# Patient Record
Sex: MALE | URBAN - METROPOLITAN AREA
[De-identification: names, ages, dates, MRNs, and addresses within clinical notes are randomized per-mention and may not be internally consistent; named-entity substitution may affect disease eponyms.]

---

## 2019-07-17 ENCOUNTER — OFFICE VISIT (OUTPATIENT)
Dept: FAMILY MEDICINE CLINIC | Facility: CLINIC | Age: 54
End: 2019-07-17
Payer: COMMERCIAL

## 2019-07-17 VITALS
HEART RATE: 77 BPM | SYSTOLIC BLOOD PRESSURE: 192 MMHG | DIASTOLIC BLOOD PRESSURE: 102 MMHG | OXYGEN SATURATION: 97 % | BODY MASS INDEX: 26.58 KG/M2 | RESPIRATION RATE: 18 BRPM | WEIGHT: 175.38 LBS | HEIGHT: 68 IN | TEMPERATURE: 97 F

## 2019-07-17 DIAGNOSIS — I10 UNCONTROLLED HYPERTENSION: Primary | ICD-10-CM

## 2019-07-17 PROCEDURE — 93000 ELECTROCARDIOGRAM COMPLETE: CPT | Performed by: NURSE PRACTITIONER

## 2019-07-17 PROCEDURE — 99204 OFFICE O/P NEW MOD 45 MIN: CPT | Performed by: NURSE PRACTITIONER

## 2019-07-17 RX ORDER — LISINOPRIL AND HYDROCHLOROTHIAZIDE 12.5; 1 MG/1; MG/1
1 TABLET ORAL DAILY
Qty: 30 TABLET | Refills: 0 | Status: SHIPPED | OUTPATIENT
Start: 2019-07-17 | End: 2019-07-30

## 2019-07-17 RX ORDER — SILDENAFIL 100 MG/1
TABLET, FILM COATED ORAL
COMMUNITY
Start: 2015-01-30 | End: 2019-07-17

## 2019-07-17 RX ORDER — OLANZAPINE 5 MG/1
5 TABLET ORAL
COMMUNITY
Start: 2008-06-18

## 2019-07-17 NOTE — PROGRESS NOTES
[facility] [place]  PROGRESS NOTE  Chief Complaint:   Patient presents with:  Medication Request      HPI:   This is a 48year old male coming in for cialis refill.       Patient is on zyprexa, is on for Shock deficit syndrome per patient though CARDIOVASCULAR:  Denies chest pain, chest pressure, chest discomfort, palpitations, edema, dyspnea on exertion or at rest.  RESPIRATORY:  Denies shortness of breath, wheezing, cough or sputum.   GASTROINTESTINAL:  Denies abdominal pain, nausea, vomiting, co EXTREMITIES:  No edema, no cyanosis, no clubbing, Full ROM, 2+ dorsalis pedis pulses bilaterally. NEURO:  No deficit, normal gait, strength and tone, sensory intact, normal reflexes. Psych: Appears slightly disoriented, repeating questions.   Not maintain Signed Prescriptions Disp Refills   • Lisinopril-hydroCHLOROthiazide 10-12.5 MG Oral Tab 30 tablet 0     Sig: Take 1 tablet by mouth daily. Patient Instructions     No cialis at this time.   Please start lisinopril/hydrochlorothiazide 1 tablet each · Learn to take your own blood pressure. Keep a record of your results. Ask your doctor which readings mean that you need medical attention. · Take your blood pressure medicine exactly as directed. Don’t skip doses.  Missing doses can cause your blood pres · Limit drinks that contain caffeine such as coffee, black or green tea, and cola to 2 per day. · Never take stimulants such as amphetamines or cocaine. These drugs can be deadly for someone with high blood pressure. · Control your stress.  Learn ways to

## 2019-07-17 NOTE — PATIENT INSTRUCTIONS
No cialis at this time. Please start lisinopril/hydrochlorothiazide 1 tablet each day in the morning. Modify your diet, eat low sodium foods. Get your labs done, get fasting labs.   Obtain your blood pressure cuff for home, check your blood pressure twic control. · If you do miss a dose (or doses) check with your healthcare provider about what to do. · Don't take medicines that contain heart stimulants, including over-the-counter medicines. Check for warnings about high blood pressure on the label.  Ask t men.  Follow-up care  Make a follow-up appointment as directed.   When to call your healthcare provider  Call your healthcare provider right away if you have any of the following:  · Chest pain or shortness of breath (call 911)  · Moderate to severe headach

## 2019-07-18 LAB
ABSOLUTE BASOPHILS: 30 CELLS/UL (ref 0–200)
ABSOLUTE EOSINOPHILS: 168 CELLS/UL (ref 15–500)
ABSOLUTE LYMPHOCYTES: 1620 CELLS/UL (ref 850–3900)
ABSOLUTE MONOCYTES: 372 CELLS/UL (ref 200–950)
ABSOLUTE NEUTROPHILS: 3810 CELLS/UL (ref 1500–7800)
ALBUMIN/GLOBULIN RATIO: 1.5 (CALC) (ref 1–2.5)
ALBUMIN: 4.6 G/DL (ref 3.6–5.1)
ALKALINE PHOSPHATASE: 67 U/L (ref 40–115)
ALT: 15 U/L (ref 9–46)
AST: 17 U/L (ref 10–35)
BASOPHILS: 0.5 %
BILIRUBIN, TOTAL: 1.3 MG/DL (ref 0.2–1.2)
BUN: 17 MG/DL (ref 7–25)
CALCIUM: 9.4 MG/DL (ref 8.6–10.3)
CARBON DIOXIDE: 29 MMOL/L (ref 20–32)
CHLORIDE: 105 MMOL/L (ref 98–110)
CHOL/HDLC RATIO: 3.4 (CALC)
CHOLESTEROL, TOTAL: 151 MG/DL
CREATININE: 0.98 MG/DL (ref 0.7–1.33)
EGFR IF AFRICN AM: 102 ML/MIN/1.73M2
EGFR IF NONAFRICN AM: 88 ML/MIN/1.73M2
EOSINOPHILS: 2.8 %
GLOBULIN: 3 G/DL (CALC) (ref 1.9–3.7)
GLUCOSE: 104 MG/DL (ref 65–99)
HDL CHOLESTEROL: 45 MG/DL
HEMATOCRIT: 40.9 % (ref 38.5–50)
HEMOGLOBIN: 14 G/DL (ref 13.2–17.1)
LDL-CHOLESTEROL: 84 MG/DL (CALC)
LYMPHOCYTES: 27 %
MCH: 29.8 PG (ref 27–33)
MCHC: 34.2 G/DL (ref 32–36)
MCV: 87 FL (ref 80–100)
MONOCYTES: 6.2 %
MPV: 12.8 FL (ref 7.5–12.5)
NEUTROPHILS: 63.5 %
NON-HDL CHOLESTEROL: 106 MG/DL (CALC)
PLATELET COUNT: 186 THOUSAND/UL (ref 140–400)
POTASSIUM: 3.8 MMOL/L (ref 3.5–5.3)
PROTEIN, TOTAL: 7.6 G/DL (ref 6.1–8.1)
RDW: 12.7 % (ref 11–15)
RED BLOOD CELL COUNT: 4.7 MILLION/UL (ref 4.2–5.8)
SODIUM: 144 MMOL/L (ref 135–146)
TRIGLYCERIDES: 123 MG/DL
WHITE BLOOD CELL COUNT: 6 THOUSAND/UL (ref 3.8–10.8)

## 2019-07-19 ENCOUNTER — TELEPHONE (OUTPATIENT)
Dept: FAMILY MEDICINE CLINIC | Facility: CLINIC | Age: 54
End: 2019-07-19

## 2019-07-19 NOTE — TELEPHONE ENCOUNTER
----- Message from DEIDRA Steinberg sent at 7/19/2019  8:05 AM CDT -----  Please notify the patient his labs returned. No sign of anemia/low blood count. His kidney and liver function are stable. Glucose is a little elevated.    Cholesterol panel

## 2019-07-25 ENCOUNTER — OFFICE VISIT (OUTPATIENT)
Dept: FAMILY MEDICINE CLINIC | Facility: CLINIC | Age: 54
End: 2019-07-25
Payer: COMMERCIAL

## 2019-07-25 VITALS
HEIGHT: 68 IN | SYSTOLIC BLOOD PRESSURE: 134 MMHG | TEMPERATURE: 98 F | DIASTOLIC BLOOD PRESSURE: 78 MMHG | RESPIRATION RATE: 16 BRPM | WEIGHT: 171.38 LBS | OXYGEN SATURATION: 97 % | HEART RATE: 74 BPM | BODY MASS INDEX: 25.97 KG/M2

## 2019-07-25 DIAGNOSIS — I10 ESSENTIAL HYPERTENSION: Primary | ICD-10-CM

## 2019-07-25 DIAGNOSIS — N52.9 ERECTILE DYSFUNCTION, UNSPECIFIED ERECTILE DYSFUNCTION TYPE: ICD-10-CM

## 2019-07-25 DIAGNOSIS — Z12.11 COLON CANCER SCREENING: ICD-10-CM

## 2019-07-25 PROCEDURE — 99213 OFFICE O/P EST LOW 20 MIN: CPT | Performed by: FAMILY MEDICINE

## 2019-07-25 RX ORDER — TADALAFIL 20 MG/1
20 TABLET ORAL
Qty: 12 TABLET | Refills: 0 | Status: SHIPPED | OUTPATIENT
Start: 2019-07-25

## 2019-07-25 NOTE — PROGRESS NOTES
[address]  PROGRESS NOTE  Chief Complaint:   Patient presents with:   Follow - Up  Medication Follow-Up      HPI:   This is a 48year old male with history of hypertension and erectile dysfunction presents for follow-up and medication re wheezing, cough or sputum. GASTROINTESTINAL:  Denies abdominal pain, nausea, vomiting, constipation, diarrhea, or blood in stool. MUSCULOSKELETAL:  Denies weakness, muscle aches, back pain, joint pain, swelling or stiffness.   NEUROLOGICAL:  Denies headac follow-up.     Diagnoses and all orders for this visit:    Essential hypertension    Colon cancer screening  -     OCCULT BLOOD, FECAL, IMMUNOASSAY    Erectile dysfunction, unspecified erectile dysfunction type    Other orders  -     Tadalafil (CIALIS) 20 M

## 2019-07-25 NOTE — PATIENT INSTRUCTIONS
Continue current medications  Blood pressure stable today. Advice low salt diet and exercise. Monitor your blood pressure. Return to clinic if systolic blood pressure more than 541 or diastolic more than 626.   Check FIT test.

## 2019-07-30 ENCOUNTER — TELEPHONE (OUTPATIENT)
Dept: FAMILY MEDICINE CLINIC | Facility: CLINIC | Age: 54
End: 2019-07-30

## 2019-07-30 NOTE — TELEPHONE ENCOUNTER
Patient takes lisinopril-hctz for his high blood pressure. Patient is complaining of a nagging cough and wants to know if there is any alternative medication he can be switched to. Last OV: 7/25/19 BP check with Dr. Roxanna Busch. Please advise.

## 2019-07-31 RX ORDER — OLMESARTAN MEDOXOMIL AND HYDROCHLOROTHIAZIDE 20/12.5 20; 12.5 MG/1; MG/1
1 TABLET ORAL DAILY
Qty: 30 TABLET | Refills: 2 | Status: SHIPPED | OUTPATIENT
Start: 2019-07-31 | End: 2019-10-24

## 2019-07-31 NOTE — TELEPHONE ENCOUNTER
Let pt know the following below. Pt verbalized his understanding and had no other questions at this time.    rx sent

## 2019-08-09 DIAGNOSIS — I10 UNCONTROLLED HYPERTENSION: ICD-10-CM

## 2019-08-09 RX ORDER — LISINOPRIL AND HYDROCHLOROTHIAZIDE 12.5; 1 MG/1; MG/1
TABLET ORAL
Qty: 30 TABLET | Refills: 0 | OUTPATIENT
Start: 2019-08-09

## 2019-08-09 NOTE — TELEPHONE ENCOUNTER
Patient is now taking Olmesartan-HCTZ 20-12.5 MG per Dr. Tigre Luis. Request denied with this notation.

## 2019-10-22 NOTE — TELEPHONE ENCOUNTER
Last refill 7/31/19  No future appointments. Last visit: 7/25/19    Instructions         Return in about 3 months (around 10/25/2019) for follow up. Continue current medications  Blood pressure stable today. Advice low salt diet and exercise. Monitor your blood pressure. Return to clinic if systolic blood pressure more than 243 or diastolic more than 741.   Check FIT test.

## 2019-10-24 ENCOUNTER — TELEPHONE (OUTPATIENT)
Dept: FAMILY MEDICINE CLINIC | Facility: CLINIC | Age: 54
End: 2019-10-24

## 2019-10-24 RX ORDER — OLMESARTAN MEDOXOMIL AND HYDROCHLOROTHIAZIDE 20/12.5 20; 12.5 MG/1; MG/1
1 TABLET ORAL DAILY
Qty: 30 TABLET | Refills: 2 | Status: SHIPPED | OUTPATIENT
Start: 2019-10-24 | End: 2020-10-18

## 2019-10-24 NOTE — TELEPHONE ENCOUNTER
Follow-up with Dr. Rodriguez.  Wear monitor as directed.  Return to the emergency department for worsening symptoms, chest pain, dizziness, fainting, or other concern.   generic benicar HCTZ    #90  for insurance guidelines

## 2019-10-25 RX ORDER — OLMESARTAN MEDOXOMIL AND HYDROCHLOROTHIAZIDE 20/12.5 20; 12.5 MG/1; MG/1
TABLET ORAL
Qty: 90 TABLET | Refills: 0 | OUTPATIENT
Start: 2019-10-25

## 2019-12-23 NOTE — TELEPHONE ENCOUNTER
No future appointments. Last visit for hypertension was 7/25/19. Return in about 3 months (around 10/25/2019) for follow up. Pt needs appt.

## 2019-12-27 RX ORDER — OLMESARTAN MEDOXOMIL AND HYDROCHLOROTHIAZIDE 20/12.5 20; 12.5 MG/1; MG/1
TABLET ORAL
Qty: 90 TABLET | Refills: 0 | OUTPATIENT
Start: 2019-12-27

## 2020-03-06 RX ORDER — OLMESARTAN MEDOXOMIL AND HYDROCHLOROTHIAZIDE 20/12.5 20; 12.5 MG/1; MG/1
TABLET ORAL
Qty: 90 TABLET | Refills: 0 | OUTPATIENT
Start: 2020-03-06
